# Patient Record
Sex: FEMALE | Race: WHITE | NOT HISPANIC OR LATINO | Employment: PART TIME | URBAN - METROPOLITAN AREA
[De-identification: names, ages, dates, MRNs, and addresses within clinical notes are randomized per-mention and may not be internally consistent; named-entity substitution may affect disease eponyms.]

---

## 2023-01-01 ENCOUNTER — OFFICE VISIT (OUTPATIENT)
Dept: URGENT CARE | Facility: CLINIC | Age: 23
End: 2023-01-01

## 2023-01-01 VITALS
SYSTOLIC BLOOD PRESSURE: 108 MMHG | BODY MASS INDEX: 24.11 KG/M2 | HEIGHT: 66 IN | RESPIRATION RATE: 16 BRPM | WEIGHT: 150 LBS | HEART RATE: 100 BPM | DIASTOLIC BLOOD PRESSURE: 74 MMHG | TEMPERATURE: 98.9 F | OXYGEN SATURATION: 98 %

## 2023-01-01 DIAGNOSIS — L03.90 CELLULITIS, UNSPECIFIED CELLULITIS SITE: ICD-10-CM

## 2023-01-01 DIAGNOSIS — L73.9 FOLLICULITIS: ICD-10-CM

## 2023-01-01 PROCEDURE — 99203 OFFICE O/P NEW LOW 30 MIN: CPT | Performed by: FAMILY MEDICINE

## 2023-01-01 RX ORDER — CLINDAMYCIN HYDROCHLORIDE 300 MG/1
300 CAPSULE ORAL 3 TIMES DAILY
Qty: 15 CAPSULE | Refills: 0 | Status: SHIPPED | OUTPATIENT
Start: 2023-01-01 | End: 2023-01-06

## 2023-01-01 ASSESSMENT — ENCOUNTER SYMPTOMS
COUGH: 0
NAUSEA: 0
SHORTNESS OF BREATH: 0
VOMITING: 0
FEVER: 0
SORE THROAT: 0
CHILLS: 0
MYALGIAS: 0

## 2023-01-01 NOTE — PATIENT INSTRUCTIONS
Cellulitis, Adult    Cellulitis is a skin infection. The infected area is often warm, red, swollen, and sore. It occurs most often in the arms and lower legs. It is very important to get treated for this condition.  What are the causes?  This condition is caused by bacteria. The bacteria enter through a break in the skin, such as a cut, burn, insect bite, open sore, or crack.  What increases the risk?  This condition is more likely to occur in people who:  Have a weak body defense system (immune system).  Have open cuts, burns, bites, or scrapes on the skin.  Are older than 60 years of age.  Have a blood sugar problem (diabetes).  Have a long-lasting (chronic) liver disease (cirrhosis) or kidney disease.  Are very overweight (obese).  Have a skin problem, such as:  Itchy rash (eczema).  Slow movement of blood in the veins (venous stasis).  Fluid buildup below the skin (edema).  Have been treated with high-energy rays (radiation).  Use IV drugs.  What are the signs or symptoms?  Symptoms of this condition include:  Skin that is:  Red.  Streaking.  Spotting.  Swollen.  Sore or painful when you touch it.  Warm.  A fever.  Chills.  Blisters.  How is this diagnosed?  This condition is diagnosed based on:  Medical history.  Physical exam.  Blood tests.  Imaging tests.  How is this treated?  Treatment for this condition may include:  Medicines to treat infections or allergies.  Home care, such as:  Rest.  Placing cold or warm cloths (compresses) on the skin.  Hospital care, if the condition is very bad.  Follow these instructions at home:  Medicines  Take over-the-counter and prescription medicines only as told by your doctor.  If you were prescribed an antibiotic medicine, take it as told by your doctor. Do not stop taking it even if you start to feel better.  General instructions    Drink enough fluid to keep your pee (urine) pale yellow.  Do not touch or rub the infected area.  Raise (elevate) the infected area above  the level of your heart while you are sitting or lying down.  Place cold or warm cloths on the area as told by your doctor.  Keep all follow-up visits as told by your doctor. This is important.  Contact a doctor if:  You have a fever.  You do not start to get better after 1-2 days of treatment.  Your bone or joint under the infected area starts to hurt after the skin has healed.  Your infection comes back. This can happen in the same area or another area.  You have a swollen bump in the area.  You have new symptoms.  You feel ill and have muscle aches and pains.  Get help right away if:  Your symptoms get worse.  You feel very sleepy.  You throw up (vomit) or have watery poop (diarrhea) for a long time.  You see red streaks coming from the area.  Your red area gets larger.  Your red area turns dark in color.  These symptoms may represent a serious problem that is an emergency. Do not wait to see if the symptoms will go away. Get medical help right away. Call your local emergency services (911 in the [place] Do not drive yourself to the hospital.  Summary  Cellulitis is a skin infection. The area is often warm, red, swollen, and sore.  This condition is treated with medicines, rest, and cold and warm cloths.  Take all medicines only as told by your doctor.  Tell your doctor if symptoms do not start to get better after 1-2 days of treatment.  This information is not intended to replace advice given to you by your health care provider. Make sure you discuss any questions you have with your health care provider.  Document Released: 06/05/2009 Document Revised: 05/09/2019 Document Reviewed: 05/09/2019  [employer] Patient Education © 2020 [employer]

## 2023-01-01 NOTE — PROGRESS NOTES
"Subjective:   Scarlet Rivera is a 22 y.o. female who presents for Rash (Sx two days ago, sensitive to touch , bumps )        Rash  Chronicity: Reports painful rash left posterior medial thigh over the past 2 days. The current episode started yesterday. The problem has been gradually worsening since onset. Location: Left posterior thigh. The rash is characterized by pain and redness. She was exposed to nothing. Pertinent negatives include no cough, fever, shortness of breath, sore throat or vomiting. Treatments tried: Routine skin hygiene. The treatment provided no relief.   PMH:  has no past medical history on file.  MEDS:   Current Outpatient Medications:     clindamycin (CLEOCIN) 300 MG Cap, Take 1 Capsule by mouth 3 times a day for 5 days., Disp: 15 Capsule, Rfl: 0    mupirocin (BACTROBAN) 2 % Ointment, Apply 1 Application topically 2 times a day., Disp: 22 g, Rfl: 0  ALLERGIES: Not on File  SURGHX: History reviewed. No pertinent surgical history.  SOCHX:    FH: History reviewed. No pertinent family history.  Review of Systems   Constitutional:  Negative for chills and fever.   HENT:  Negative for sore throat.    Respiratory:  Negative for cough and shortness of breath.    Gastrointestinal:  Negative for nausea and vomiting.   Musculoskeletal:  Negative for myalgias.   Skin:  Positive for rash.      Objective:   Pulse 100   Temp 37.2 °C (98.9 °F) (Temporal)   Resp 16   Ht 1.676 m (5' 6\")   Wt 68 kg (150 lb)   SpO2 98%   BMI 24.21 kg/m²   Physical Exam  Vitals and nursing note reviewed.   Constitutional:       General: She is not in acute distress.     Appearance: She is well-developed.   HENT:      Head: Normocephalic and atraumatic.      Right Ear: External ear normal.      Left Ear: External ear normal.      Nose: Nose normal.      Mouth/Throat:      Mouth: Mucous membranes are moist.   Eyes:      Conjunctiva/sclera: Conjunctivae normal.   Cardiovascular:      Rate and Rhythm: Normal rate.   Pulmonary:      " Effort: Pulmonary effort is normal. No respiratory distress.      Breath sounds: Normal breath sounds.   Abdominal:      General: There is no distension.   Musculoskeletal:         General: Normal range of motion.        Legs:    Skin:     General: Skin is warm and dry.   Neurological:      General: No focal deficit present.      Mental Status: She is alert and oriented to person, place, and time. Mental status is at baseline.      Gait: Gait (gait at baseline) normal.   Psychiatric:         Judgment: Judgment normal.         Assessment/Plan:   1. Cellulitis, unspecified cellulitis site  - clindamycin (CLEOCIN) 300 MG Cap; Take 1 Capsule by mouth 3 times a day for 5 days.  Dispense: 15 Capsule; Refill: 0    2. Folliculitis  - mupirocin (BACTROBAN) 2 % Ointment; Apply 1 Application topically 2 times a day.  Dispense: 22 g; Refill: 0        Medical Decision Making/Course:  In the course of preparing for this visit with review of the pertinent past medical history, recent and past clinic visits, current medications, and performing chart, immunization, medical history and medication reconciliation, and in the further course of obtaining the current history pertinent to the clinic visit today, performing an exam and evaluation, ordering and independently evaluating labs, tests  , and/or procedures, prescribing any recommended new medications as noted above, providing any pertinent counseling and education and recommending further coordination of care including recommendation for symptomatic and supportive measures, at least  14 minutes of total time were spent during this encounter.      Discussed close monitoring, return precautions, and supportive measures of maintaining adequate fluid hydration and caloric intake, relative rest and symptom management as needed for pain and/or fever.    Differential diagnosis, natural history, supportive care, and indications for immediate follow-up discussed.     Advised the patient  to follow-up with the primary care physician for recheck, reevaluation, and consideration of further management.    Please note that this dictation was created using voice recognition software. I have worked with consultants from the vendor as well as technical experts from [employer] to optimize the interface. I have made every reasonable attempt to correct obvious errors, but I expect that there are errors of grammar and possibly content that I did not discover before finalizing the note.

## 2023-11-09 ENCOUNTER — OFFICE VISIT (OUTPATIENT)
Dept: URGENT CARE | Facility: CLINIC | Age: 23
End: 2023-11-09
Payer: COMMERCIAL

## 2023-11-09 VITALS
BODY MASS INDEX: 25.9 KG/M2 | SYSTOLIC BLOOD PRESSURE: 102 MMHG | RESPIRATION RATE: 18 BRPM | TEMPERATURE: 99 F | HEIGHT: 67 IN | WEIGHT: 165 LBS | OXYGEN SATURATION: 98 % | DIASTOLIC BLOOD PRESSURE: 84 MMHG | HEART RATE: 94 BPM

## 2023-11-09 DIAGNOSIS — S61.213A LACERATION OF LEFT MIDDLE FINGER WITHOUT FOREIGN BODY WITHOUT DAMAGE TO NAIL, INITIAL ENCOUNTER: ICD-10-CM

## 2023-11-09 PROCEDURE — 3074F SYST BP LT 130 MM HG: CPT | Performed by: REGISTERED NURSE

## 2023-11-09 PROCEDURE — 3079F DIAST BP 80-89 MM HG: CPT | Performed by: REGISTERED NURSE

## 2023-11-09 PROCEDURE — 12002 RPR S/N/AX/GEN/TRNK2.6-7.5CM: CPT | Performed by: REGISTERED NURSE

## 2023-11-09 NOTE — LETTER
November 9, 2023         Patient: Scarlet Rivera   YOB: 2000   Date of Visit: 11/9/2023           To Whom it May Concern:    Scarlet Rivera was seen in my clinic on 11/9/2023. She may return to work on 11/13/23.    If you have any questions or concerns, please don't hesitate to call.        Sincerely,           AYESHA Horowitz  Electronically Signed

## 2023-11-09 NOTE — PROGRESS NOTES
"Subjective:     Scarlet Rivera is a 22 y.o. female who presents for Laceration (About 3 hours ago, 3rd finger on left hand )      Laceration     Today taking  out of dishwaher and got a cut to the top of left middle finger. Bleeding controlled. No numbness or tingling. Normal ROM. Not on blood thinners. Tetanus up to date. Patients states she does not do well with needles and passes out. Left hand dominant. Works in  which is closed tomorrow.    ROS : Negative unless mentioned in hpi    Allergies: No Known Allergies    Current Medications:  mupirocin Oint    (Allergies, Medications, & Tobacco/Substance Use were reconciled by the Medical Assistant and reviewed by myself. )       Objective:     /84   Pulse 94   Temp 37.2 °C (99 °F) (Temporal)   Resp 18   Ht 1.702 m (5' 7\")   Wt 74.8 kg (165 lb)   SpO2 98%   BMI 25.84 kg/m²     Physical Exam  Vitals and nursing note reviewed.   Constitutional:       Appearance: She is not ill-appearing or toxic-appearing.   HENT:      Head: Normocephalic.   Eyes:      Pupils: Pupils are equal, round, and reactive to light.   Cardiovascular:      Rate and Rhythm: Normal rate.   Pulmonary:      Effort: Pulmonary effort is normal.   Musculoskeletal:      Comments: Normal ROM left middle finger. Neurovascular intact distally   Skin:     Comments: 2.5cm full thickness laceration dorsal left middle finger, no foreign bodies, no pulsatile bleeding   Neurological:      General: No focal deficit present.      Mental Status: She is alert.      Sensory: No sensory deficit.   Psychiatric:         Mood and Affect: Mood normal.         Assessment/Plan:     1. Laceration of left middle finger without foreign body without damage to nail, initial encounter          Tdap up to date.    Laceration Repair     -Risks including bleeding, nerve damage, infection, and poor cosmetic outcome discussed at length. Benefits and alternatives discussed. Verbal consent was " obtained.    Performed by: Aaron IBARRA  Body area: left hand  Location details: middle finger, dorsal  Laceration length: 2.5 cm  Foreign bodies: no foreign bodies     Anesthesia:  Local Anesthetic: 1% lido     Sedation:  Patient sedated: no     Irrigation solution: saline  Irrigation method: syringe  Amount of cleaning: standard  Debridement: none  Degree of undermining: none  Skin closure: #4 5-0 Nylon simple interrupted sutures with good wound approximation    -Patient tolerated well with no complications or blood loss  -Wound care discussed  -Signs and symptoms of new/worsening infection discussed at length  -Return in 7-10 days for removal      Differential diagnosis, natural history, supportive care, and indications for immediate follow-up discussed.    Advised the patient to follow-up with the primary care physician for recheck, reevaluation, and consideration of further management.    Please note that this dictation was created using voice recognition software. I have made reasonable attempt to correct obvious errors, but I expect that there are errors of grammar and possibly content that I did not discover before finalizing the note.    This note was electronically signed by AYESHA Horowitz

## 2024-06-30 ENCOUNTER — OFFICE VISIT (OUTPATIENT)
Dept: URGENT CARE | Facility: CLINIC | Age: 24
End: 2024-06-30
Payer: COMMERCIAL

## 2024-06-30 VITALS
RESPIRATION RATE: 16 BRPM | SYSTOLIC BLOOD PRESSURE: 112 MMHG | HEART RATE: 106 BPM | WEIGHT: 154.9 LBS | DIASTOLIC BLOOD PRESSURE: 64 MMHG | HEIGHT: 67 IN | TEMPERATURE: 97.5 F | BODY MASS INDEX: 24.31 KG/M2 | OXYGEN SATURATION: 98 %

## 2024-06-30 DIAGNOSIS — R59.0 LAD (LYMPHADENOPATHY) OF LEFT CERVICAL REGION: ICD-10-CM

## 2024-06-30 LAB
HETEROPH AB SER QL LA: NEGATIVE
POCT INT CON NEG: NEGATIVE
POCT INT CON POS: NEGATIVE
S PYO DNA SPEC NAA+PROBE: NOT DETECTED

## 2024-06-30 ASSESSMENT — ENCOUNTER SYMPTOMS
FEVER: 0
NAUSEA: 0
CHILLS: 0
NECK PAIN: 1
VOMITING: 0

## 2024-06-30 NOTE — PROGRESS NOTES
"Subjective:   Scarlet Rivera is a 23 y.o. female who presents for Neck Pain (X 2 weeks went to the ER and gave her IV fluids)        Patient presents with concerns of a painful swollen lump on the left side of her neck for the last 2+ weeks.  States that she was seen in the emergency room for this 5 days ago.  States that no testing was performed.  Patient is very concerned that she may have cancer.  No night sweats, weight loss, sore throat, pain with swallowing, fevers, chills, malaise, fatigue.  Her ear does feel painful at times.  Patient had a root canal about 2 weeks ago and completed a course of clindamycin.  She is not currently on antibiotic therapy.  She is not taking any medications for her symptoms.    Review of Systems   Constitutional:  Negative for chills and fever.   Gastrointestinal:  Negative for nausea and vomiting.   Musculoskeletal:  Positive for neck pain.       PMH:  has no past medical history on file.  MEDS:   Current Outpatient Medications:     mupirocin (BACTROBAN) 2 % Ointment, Apply 1 Application topically 2 times a day. (Patient not taking: Reported on 11/9/2023), Disp: 22 g, Rfl: 0  ALLERGIES: No Known Allergies  SURGHX: No past surgical history on file.  SOCHX:  reports that she has never smoked. She has never used smokeless tobacco. She reports current alcohol use. She reports that she does not currently use drugs.  FH: Family history was reviewed, no pertinent findings to report   Objective:   /64   Pulse (!) 106   Temp 36.4 °C (97.5 °F) (Temporal)   Resp 16   Ht 1.702 m (5' 7\")   Wt 70.3 kg (154 lb 14.4 oz)   SpO2 98%   BMI 24.26 kg/m²   Physical Exam  Vitals reviewed.   Constitutional:       Appearance: Normal appearance. She is well-developed. She is not toxic-appearing.      Comments: Patient is tearful and anxious.   HENT:      Head: Normocephalic and atraumatic.      Right Ear: Ear canal and external ear normal. Tympanic membrane is scarred. Tympanic membrane " is not erythematous or bulging.      Left Ear: Ear canal and external ear normal.  No middle ear effusion. Tympanic membrane is scarred. Tympanic membrane is not erythematous or bulging.      Nose: Nose normal.   Cardiovascular:      Rate and Rhythm: Normal rate and regular rhythm.      Heart sounds: Normal heart sounds, S1 normal and S2 normal.   Pulmonary:      Effort: Pulmonary effort is normal. No respiratory distress.      Breath sounds: Normal breath sounds. No stridor. No decreased breath sounds, wheezing, rhonchi or rales.   Lymphadenopathy:      Cervical:      Right cervical: No superficial cervical adenopathy.     Left cervical: No superficial cervical adenopathy.      Comments: Mildly enlarged left posterior cervical lymph node as compared to the right side.  No overlying erythema or edema.  Mildly tender to palpation.   Skin:     General: Skin is dry.   Neurological:      Comments: Alert and oriented.    Psychiatric:         Speech: Speech normal.         Behavior: Behavior normal.         Results for orders placed or performed in visit on 06/30/24   POCT GROUP A STREP, PCR   Result Value Ref Range    POC Group A Strep, PCR Not Detected Not Detected, Invalid   POCT Mononucleosis (mono)   Result Value Ref Range    Heterophile Screen Negative Negative, Invalid    Internal Control Positive Negative     Internal Control Negative Negative        Assessment/Plan:   1. LAD (lymphadenopathy) of left cervical region  - US-SOFT TISSUES OF HEAD - NECK; Future  - POCT GROUP A STREP, PCR  - POCT Mononucleosis (mono)    Testing for group A strep and mono are negative.  Will obtain an ultrasound to further evaluate.  I will contact patient with results and adjust treatment plan as indicated.

## 2024-07-01 NOTE — RESULT ENCOUNTER NOTE
Anil Novak,    Your testing for strep throat and mono were negative.  I will be in touch on Tuesday to go over your ultrasound results.      Kind regards,  Oneal

## 2024-07-02 ENCOUNTER — HOSPITAL ENCOUNTER (OUTPATIENT)
Dept: RADIOLOGY | Facility: MEDICAL CENTER | Age: 24
End: 2024-07-02
Attending: PHYSICIAN ASSISTANT
Payer: COMMERCIAL

## 2024-07-02 DIAGNOSIS — R59.0 LAD (LYMPHADENOPATHY) OF LEFT CERVICAL REGION: ICD-10-CM

## 2024-07-02 PROCEDURE — 76536 US EXAM OF HEAD AND NECK: CPT

## 2024-07-06 ENCOUNTER — OFFICE VISIT (OUTPATIENT)
Dept: URGENT CARE | Facility: CLINIC | Age: 24
End: 2024-07-06
Payer: COMMERCIAL

## 2024-07-06 VITALS
TEMPERATURE: 98.5 F | OXYGEN SATURATION: 97 % | WEIGHT: 152.3 LBS | DIASTOLIC BLOOD PRESSURE: 78 MMHG | RESPIRATION RATE: 13 BRPM | BODY MASS INDEX: 23.9 KG/M2 | SYSTOLIC BLOOD PRESSURE: 112 MMHG | HEIGHT: 67 IN | HEART RATE: 96 BPM

## 2024-07-06 DIAGNOSIS — B37.0 ORAL THRUSH: ICD-10-CM

## 2024-07-06 PROCEDURE — 99213 OFFICE O/P EST LOW 20 MIN: CPT | Performed by: NURSE PRACTITIONER

## 2024-07-06 PROCEDURE — 3074F SYST BP LT 130 MM HG: CPT | Performed by: NURSE PRACTITIONER

## 2024-07-06 PROCEDURE — 3078F DIAST BP <80 MM HG: CPT | Performed by: NURSE PRACTITIONER

## 2024-07-06 RX ORDER — CLOTRIMAZOLE 10 MG/1
10 LOZENGE ORAL
Qty: 35 TROCHE | Refills: 1 | Status: SHIPPED | OUTPATIENT
Start: 2024-07-06

## 2024-07-22 ENCOUNTER — APPOINTMENT (OUTPATIENT)
Dept: URGENT CARE | Facility: CLINIC | Age: 24
End: 2024-07-22
Payer: COMMERCIAL

## 2025-04-28 ENCOUNTER — OFFICE VISIT (OUTPATIENT)
Dept: URGENT CARE | Facility: CLINIC | Age: 25
End: 2025-04-28
Payer: COMMERCIAL

## 2025-04-28 VITALS
RESPIRATION RATE: 12 BRPM | HEIGHT: 67 IN | SYSTOLIC BLOOD PRESSURE: 116 MMHG | HEART RATE: 100 BPM | DIASTOLIC BLOOD PRESSURE: 78 MMHG | TEMPERATURE: 97.2 F | WEIGHT: 164 LBS | BODY MASS INDEX: 25.74 KG/M2 | OXYGEN SATURATION: 99 %

## 2025-04-28 DIAGNOSIS — M54.50 ACUTE LEFT-SIDED LOW BACK PAIN WITHOUT SCIATICA: ICD-10-CM

## 2025-04-28 LAB
APPEARANCE UR: CLEAR
BILIRUB UR STRIP-MCNC: NEGATIVE MG/DL
COLOR UR AUTO: YELLOW
GLUCOSE UR STRIP.AUTO-MCNC: NEGATIVE MG/DL
KETONES UR STRIP.AUTO-MCNC: NEGATIVE MG/DL
LEUKOCYTE ESTERASE UR QL STRIP.AUTO: NEGATIVE
NITRITE UR QL STRIP.AUTO: NEGATIVE
PH UR STRIP.AUTO: 5.5 [PH] (ref 5–8)
POCT INT CON NEG: NEGATIVE
POCT INT CON POS: POSITIVE
POCT URINE PREGNANCY TEST: NEGATIVE
PROT UR QL STRIP: NEGATIVE MG/DL
RBC UR QL AUTO: NEGATIVE
SP GR UR STRIP.AUTO: <=1.005
UROBILINOGEN UR STRIP-MCNC: 0.2 MG/DL

## 2025-04-28 RX ORDER — CYCLOBENZAPRINE HCL 5 MG
5 TABLET ORAL 3 TIMES DAILY PRN
Qty: 30 TABLET | Refills: 0 | Status: SHIPPED | OUTPATIENT
Start: 2025-04-28

## 2025-04-28 RX ORDER — MELOXICAM 15 MG/1
15 TABLET ORAL DAILY
Qty: 30 TABLET | Refills: 0 | Status: SHIPPED | OUTPATIENT
Start: 2025-04-28

## 2025-04-28 ASSESSMENT — ENCOUNTER SYMPTOMS
CHILLS: 0
TINGLING: 1
EYE REDNESS: 0
SORE THROAT: 0
NAUSEA: 0
FEVER: 0
VOMITING: 0
NUMBNESS: 0
BACK PAIN: 1
LEG PAIN: 0
MYALGIAS: 0
DIZZINESS: 0
SHORTNESS OF BREATH: 0

## 2025-04-29 NOTE — PROGRESS NOTES
Subjective:   Scarlet Rivera is a 24 y.o. female who presents for Back Pain (Patient is here today for back pain. Patient states it is in the lower area of her back. Patient states that the pain is mostly on her left side. )      Back Pain  This is a new problem. The current episode started more than 1 month ago. The problem occurs constantly. The problem is unchanged. The pain is present in the lumbar spine. The quality of the pain is described as aching and shooting. The pain radiates to the left foot. The pain is moderate. The pain is The same all the time. The symptoms are aggravated by position and standing. Stiffness is present All day. Associated symptoms include tingling. Pertinent negatives include no chest pain, dysuria, fever, leg pain, numbness or pelvic pain. She has tried analgesics for the symptoms.       Review of Systems   Constitutional:  Negative for chills and fever.   HENT:  Negative for sore throat.    Eyes:  Negative for redness.   Respiratory:  Negative for shortness of breath.    Cardiovascular:  Negative for chest pain.   Gastrointestinal:  Negative for nausea and vomiting.   Genitourinary:  Negative for dysuria and pelvic pain.   Musculoskeletal:  Positive for back pain. Negative for myalgias.   Skin:  Negative for rash.   Neurological:  Positive for tingling. Negative for dizziness and numbness.       Medications:    clotrimazole Troc  cyclobenzaprine  meloxicam  mupirocin Oint    Allergies: Patient has no known allergies.    Problem List: Scarlet Rivera does not have a problem list on file.    Surgical History:  No past surgical history on file.    Past Social Hx: Scarlet Rivera  reports that she has never smoked. She has never used smokeless tobacco. She reports current alcohol use. She reports that she does not currently use drugs.     Past Family Hx:  Scarlet Rivera family history is not on file.     Problem list, medications, and allergies reviewed by myself today in  "Epic.     Objective:     /78   Pulse 100   Temp 36.2 °C (97.2 °F) (Temporal)   Resp 12   Ht 1.689 m (5' 6.5\")   Wt 74.4 kg (164 lb)   SpO2 99%   BMI 26.07 kg/m²     Physical Exam  Constitutional:       Appearance: Normal appearance. She is not ill-appearing or toxic-appearing.   HENT:      Head: Normocephalic.      Right Ear: External ear normal.      Left Ear: External ear normal.      Nose: Nose normal.      Mouth/Throat:      Lips: Pink.   Eyes:      General: Lids are normal.   Pulmonary:      Effort: Pulmonary effort is normal. No accessory muscle usage.   Abdominal:      Tenderness: There is no right CVA tenderness or left CVA tenderness.   Musculoskeletal:      Cervical back: Normal and full passive range of motion without pain.      Thoracic back: Normal.      Lumbar back: Spasms and tenderness present. No swelling, deformity or bony tenderness. Decreased range of motion.   Neurological:      Mental Status: She is alert and oriented to person, place, and time.   Psychiatric:         Mood and Affect: Mood normal.         Thought Content: Thought content normal.         Assessment/Plan:     Diagnosis and associated orders:     1. Acute left-sided low back pain without sciatica  POCT Pregnancy    POCT Urinalysis    cyclobenzaprine (FLEXERIL) 5 MG tablet    meloxicam (MOBIC) 15 MG tablet    Referral to Physical Therapy    Referral to Pain Clinic         Comments/MDM:     I personally reviewed prior external notes and prior test results pertinent to today's visit.  UA negative, no signs of cauda equina ongoing symptoms x 1 month unimproved.  Encouraged range of motion, heat massage, stretching.  Discussed management options, risks and benefits, and alternatives to treatment plan agreed upon.  Will transfer care to physiatry for further evaluation  Red flags discussed and indications to immediately call 911 or present to the Emergency Department.   Supportive care, differential diagnoses, and " indications for immediate follow-up discussed with patient.    Patient expresses understanding and agrees to plan. Patient denies any other questions or concerns.                  Please note that this dictation was created using voice recognition software. I have made a reasonable attempt to correct obvious errors, but I expect that there are errors of grammar and possibly content that I did not discover before finalizing the note.    This note was electronically signed by Matthew IBARRA.

## 2025-04-29 NOTE — Clinical Note
Member Name: Scarlet Rivera   Member Number: [id number]   Reference Number: [id number]   Approved Services: Consultation   Approved Service Dates: 04/28/2025 - 04/28/2026   Requesting Provider: Matthew Anthony   Requested Provider: [facility]     Dear Scarlet Rivera:     The following medical service(s) requested by Matthew Anthony have been approved:    Procedure Code Procedure Code Name Requested Quantity Approved Quantity Status   95201 (CPT®) [place] OFFICE/OUTPATIENT NEW MODERATE MDM 45 MINUTES 1 1 Authorized   21514 (CPT®) [place] OFFICE/OUTPATIENT ESTABLISHED MOD MDM 30 MIN 5 5 Authorized       Approved Quantity means the number of visits approved for medication treatments and/or medical services.    The services should be provided by [facility] no later than 04/28/2026. Please contact the provider listed below with any questions.     Provider Information:  [facility]  [phone]    Your plan benefit may require a deductible, co-payment or coinsurance for these services. This authorization does not guarantee [facility] will pay the claim for services that you receive. Payment by [facility] for these services is subject to the terms of your Evidence of Coverage or Summary Plan Description, your eligibility at the time of service, and confirmation of benefit coverage.    For any questions or additional information, please contact Customer Service:    [facility]  Customer Service: [phone] or toll free [phone]  TTY users dial: 711   Call Center Hours: Mon - Fri 7 AM to 8 PM PST   Office Hours: Mon - Fri 8 AM to 5 PM [facility]   E-mail: [email or web address]   Website: [email or web address]       This information is available for free in other languages. Please contact Customer Service at the phone number above for more information. [facility] complies with applicable Federal civil rights laws and does not  discriminate on the basis of race, color, national origin, age, disability or sex.      Sincerely,     Healthcare Utilization Management Department     Cc: [facility]   Matthew Anthony

## 2025-05-02 NOTE — Clinical Note
REFERRAL APPROVAL NOTICE         Sent on May 2, 2025                   Scarlet Rivera  [address]  [address]  [place] [address]                   Dear Ms. Rivera,    After a careful review of the medical information and benefit coverage, [facility] has processed your referral. See below for additional details.    If applicable, you must be actively enrolled with your insurance for coverage of the authorized service. If you have any questions regarding your coverage, please contact your insurance directly.    REFERRAL INFORMATION   Referral #:  [id number]  Referred-To Department    Referred-By Provider:  Physical Therapy    AYESHA Lock   [facility] [address]      [address]  [address]  [place] [id number] [phone] [address]  [address]  [place] [id number] [phone]    Referral Start Date:  04/28/2025  Referral End Date:   12/31/2025             SCHEDULING  If you do not already have an appointment, please call [phone] to make an appointment.     MORE INFORMATION  If you do not already have a [employer] account, sign up at: [email or web address]  You can access your medical information, make appointments, see lab results, billing information, and more.  If you have questions regarding this referral, please contact  the [facility] Referrals department at:             [phone]. Monday - Friday 8:00AM - 5:00PM.     Sincerely,    [facility]

## 2025-05-07 NOTE — PROGRESS NOTES
Verbal consent was acquired by the patient to use [employer] ambient listening note generation during this visit Yes     NEW PATIENT VISIT     Interventional Spine and Pain  Physiatry (Physical Medicine and Rehabilitation)     Date of Service: See Epic  Chief Complaint:   Chief Complaint   Patient presents with    New Patient      Acute left-sided low back pain without sciatica      Referring Provider: Matthew Anthony A.P.*   Patient Name: Scarlet Rivera   : 2000   MRN: [id number]     History:     HPI:     Scarlet Rivera is a 24 y.o. female who presents to clinic for evaluation of back and leg pain.     History of Present Illness  The patient is a 24-year-old female who presents today for low back pain and bilateral leg numbness. She has been experiencing low back pain for approximately a month, which was initially rated as 7 to 8 out of 10 in intensity and has since decreased to 3 to 4 out of 10. The pain is described as an aching and sharp sensation in the bilateral low back and superior buttocks, accompanied by numbness that alternates between the right and left posterior thigh and calf. The pain intensifies at the end of the day and is exacerbated by bending forward, backward, twisting, and climbing stairs. It is somewhat alleviated by standing, walking, or lying down. She sought medical attention at an urgent care facility where she was prescribed medication, which she believes has been effective. The healthcare providers at the urgent care suspected sciatica. She reports no specific incident that could have triggered the pain. 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 The pain initially presented in her posterior low back and hips, more so on the left side, and was constant, severely limiting her ability to bend over or  objects. Despite some improvement, she continues to experience pain even while  sitting. She reports no history of similar issues. The numbness initially presented in her left leg and subsequently extended into her calf, but not into her foot. This symptom is intermittent. She reports no leg weakness or changes in bowel or bladder function. She has not undergone any physical therapy or exercises. She has not taken any recent antibiotics. She is currently taking meloxicam daily and Flexeril three times daily, which does not induce drowsiness.       Red Flags ROS:   Fever, Chills, Sweats: Denies  Involuntary Weight Loss: Denies  Bladder Incontinence: Denies  Bowel Incontinence: Denies  Saddle Anesthesia: Denies       Medical Records Review:  I reviewed the note from the referring provider Matthew Anthony A.P.* including the note dated 4/28/25 where they discussed back and leg pain.       PMHx:   History reviewed. No pertinent past medical history.    No current outpatient medications on file prior to visit.     No current facility-administered medications on file prior to visit.        PSHx:   History reviewed. No pertinent surgical history.    Family history   History reviewed. No pertinent family history.      Medications: Reviewed on [facility]  Outpatient Medications Marked as Taking for the 5/8/25 encounter (Office Visit) with Freya Patel M.D.   Medication Sig Dispense Refill    meloxicam (MOBIC) 15 MG tablet Take 1 Tablet by mouth every day. 30 Tablet 1    cyclobenzaprine (FLEXERIL) 5 MG tablet Take 1 Tablet by mouth 3 times a day as needed for Moderate Pain or Muscle Spasms. 90 Tablet 1        Allergies:   No Known Allergies    Social Hx:   Social History     Socioeconomic History    Marital status: Single     Spouse name: Not on file    Number of children: Not on file    Years of education: Not on file    Highest education level: Not on file   Occupational History    Not on file   Tobacco Use    Smoking status: Never    Smokeless tobacco: Never   Vaping Use    Vaping status: Never Used  "  Substance and Sexual Activity    Alcohol use: Yes     Comment: occ    Drug use: Not Currently    Sexual activity: Not on file   Other Topics Concern    Not on file   Social History Narrative    Not on file     Social Drivers of Health     Financial Resource Strain: Not on file   Food Insecurity: Not on file   Transportation Needs: Not on file   Physical Activity: Not on file   Stress: Not on file   Social Connections: Not on file   Intimate Partner Violence: Not on file   Housing Stability: Not on file          EXAMINATION     Physical Exam:   Vitals: /80 (BP Location: Right arm, Patient Position: Sitting, BP Cuff Size: Adult)   Pulse 99   Temp 36.4 °C (97.6 °F) (Temporal)   Ht 1.702 m (5' 7\")   Wt 74.8 kg (164 lb 14.5 oz)   SpO2 99%       Constitutional:   Body Habitus: Body mass index is 25.83 kg/m².  Cooperation: Fully cooperates with exam  Appearance: Well-groomed, well-nourished  Eyes: No scleral icterus to suggest severe liver disease, no proptosis to suggest severe hyperthyroid  ENT: No obvious auditory deficits, no obvious tongue lesions, tongue midline, no facial droop   Respiratory:  Breathing comfortable on room air, no audible wheezing  Cardiovascular: Skin appears well-perfused  Psychiatric: Appropriate affect  Gait: normal gait, no use of ambulatory device, nonantalgic. the patient can toe walk with ease. the patient can heel walk with ease. the patient can tandem walk with ease.    Neurologic:  Strength:    Bilateral LE 5/5 in hip flexors, knee extensors and flexors, ankle dorsiflexors and plantarflexors  Reflexes: 2+ in bilateral patella and achilles  Sensation: grossly intact bilaterally dermatomes L3-S1  MSK:?TTP across lower lumbar axial spinous processes and paraspinals bilaterally, has?preserved?active lumbar ROM with pain with extension     Special?tests:    Positive slump test bilaterally       MEDICAL DECISION MAKING    Medical Records Review: See under HPI " section    DATA      Imaging:   No spine imaging         Diagnosis   Visit Diagnoses     ICD-10-CM   1. Acute bilateral low back pain with bilateral sciatica  M54.42    M54.41   2. Discogenic low back pain  M51.360         ASSESSMENT AND PLAN:  Scarlet Rivera is a 24 y.o. female who presents to clinic for evaluation of low back pain and bilateral leg numbness.     Scarlet was seen today for new patient.    Diagnoses and all orders for this visit:    Acute bilateral low back pain with bilateral sciatica  -     Referral to Physical Therapy  -     meloxicam (MOBIC) 15 MG tablet; Take 1 Tablet by mouth every day.  -     cyclobenzaprine (FLEXERIL) 5 MG tablet; Take 1 Tablet by mouth 3 times a day as needed for Moderate Pain or Muscle Spasms.    Discogenic low back pain  -     Referral to Physical Therapy  -     meloxicam (MOBIC) 15 MG tablet; Take 1 Tablet by mouth every day.  -     cyclobenzaprine (FLEXERIL) 5 MG tablet; Take 1 Tablet by mouth 3 times a day as needed for Moderate Pain or Muscle Spasms.        Assessment & Plan  Bilateral low back pain with bilateral lower extremity numbness  Discogenic low back pain  Patient presents for evaluation of approximately 1 month of moderate to severe bilateral low back pain with intermittent numbness and tingling in the bilateral posterior thighs and calves. The patient's symptoms are indicative of a potential disc herniation, either discogenic low back pain or lumbar radiculopathy. We discussed the favorable natural history of disc herniation. Neurological examination reveals no abnormalities, with strength, sensation, and reflexes all within normal limits, and so we will start with conservative care. She will continue her current medication regimen, including meloxicam once daily and Flexeril 5 mg three times daily, for an additional 2 months. Refills for these medications have been provided to [facility] on [facility]. A referral to [facility] has been made for  strengthening or the core and lumbar spine. She has been advised to perform home exercises, holding each for a count of three and completing 10 repetitions on each side. If any exercise exacerbates her pain, she should discontinue it until her physical therapy appointment. Concurrent use of ibuprofen with meloxicam is contraindicated, but Tylenol can be safely used in conjunction with meloxicam. Should there be no improvement in her condition, an MRI will be considered to evaluate the possibility of a significant disc herniation and for consideration of epidural steroid injection.      Physical Therapy: I ordered physical therapy to focus on strengthening and stretching for the lumbar spine    Home Exercise Program: I provided the patient with a strengthening and stretching with a home exercise program targeting the low back    Diagnostic Workup: Consider MRI lumbar spine if no improvement after 6 weeks    Medications: Meloxicam 15 mg daily, not to be used with other NSAIDs. Cyclobenzaprine 5 mg up to three times a day as needed.    Follow-up: In 6 weeks      Please note that this dictation was created using voice recognition software. I have made every reasonable attempt to correct obvious errors but there may be errors of grammar and content that I may have overlooked prior to finalization of this note.      Freya Patel MD  Physical Medicine and Rehabilitation  Interventional Spine and Sports Physiatry  [facility]       CC Pcp Pt States None   CC Matthew Anthony A.P.*.

## 2025-05-08 ENCOUNTER — APPOINTMENT (OUTPATIENT)
Dept: PHYSICAL MEDICINE AND REHAB | Facility: MEDICAL CENTER | Age: 25
End: 2025-05-08
Payer: COMMERCIAL

## 2025-05-08 VITALS
BODY MASS INDEX: 25.88 KG/M2 | WEIGHT: 164.9 LBS | TEMPERATURE: 97.6 F | HEIGHT: 67 IN | SYSTOLIC BLOOD PRESSURE: 124 MMHG | OXYGEN SATURATION: 99 % | HEART RATE: 99 BPM | DIASTOLIC BLOOD PRESSURE: 80 MMHG

## 2025-05-08 DIAGNOSIS — M54.42 ACUTE BILATERAL LOW BACK PAIN WITH BILATERAL SCIATICA: Primary | ICD-10-CM

## 2025-05-08 DIAGNOSIS — M51.360 DISCOGENIC LOW BACK PAIN: ICD-10-CM

## 2025-05-08 DIAGNOSIS — M54.41 ACUTE BILATERAL LOW BACK PAIN WITH BILATERAL SCIATICA: Primary | ICD-10-CM

## 2025-05-08 PROCEDURE — 3074F SYST BP LT 130 MM HG: CPT | Performed by: STUDENT IN AN ORGANIZED HEALTH CARE EDUCATION/TRAINING PROGRAM

## 2025-05-08 PROCEDURE — 1125F AMNT PAIN NOTED PAIN PRSNT: CPT | Performed by: STUDENT IN AN ORGANIZED HEALTH CARE EDUCATION/TRAINING PROGRAM

## 2025-05-08 PROCEDURE — 99204 OFFICE O/P NEW MOD 45 MIN: CPT | Performed by: STUDENT IN AN ORGANIZED HEALTH CARE EDUCATION/TRAINING PROGRAM

## 2025-05-08 PROCEDURE — 3079F DIAST BP 80-89 MM HG: CPT | Performed by: STUDENT IN AN ORGANIZED HEALTH CARE EDUCATION/TRAINING PROGRAM

## 2025-05-08 RX ORDER — MELOXICAM 15 MG/1
15 TABLET ORAL DAILY
Qty: 30 TABLET | Refills: 1 | Status: SHIPPED | OUTPATIENT
Start: 2025-05-08

## 2025-05-08 RX ORDER — CYCLOBENZAPRINE HCL 5 MG
5 TABLET ORAL 3 TIMES DAILY PRN
Qty: 90 TABLET | Refills: 1 | Status: SHIPPED | OUTPATIENT
Start: 2025-05-08

## 2025-05-08 ASSESSMENT — PAIN SCALES - GENERAL: PAINLEVEL_OUTOF10: 2=MINIMAL-SLIGHT

## 2025-05-08 ASSESSMENT — PATIENT HEALTH QUESTIONNAIRE - PHQ9
CLINICAL INTERPRETATION OF PHQ2 SCORE: 1
SUM OF ALL RESPONSES TO PHQ QUESTIONS 1-9: 3
5. POOR APPETITE OR OVEREATING: 0 - NOT AT ALL

## 2025-05-29 NOTE — PROGRESS NOTES
Verbal consent was acquired by the patient to use [employer] ambient listening note generation during this visit Yes     FOLLOW-UP PATIENT VISIT    Interventional Spine and Pain  Physiatry (Physical Medicine and Rehabilitation)     Date of Service: 25   Chief Complaint:   Chief Complaint   Patient presents with    Follow-Up     Acute bilateral low back pain with bilateral sciatica        Patient Name: Scarlet Rivera   : 2000   MRN: [id number]       PRIOR HISTORY    Please see new patient note by Dr. Patel for more details.     Interval History  Patient identification: Scarlet Rivera,  2000, who presents today for follow-up of low back pain and bilateral leg numbness.    History of Present Illness  The patient is a 24-year-old female who presents today for follow-up of left greater than right low back pain with tingling in the left greater than right buttock and lower extremity. Since she was last seen approximately 3 weeks ago, she reports that her pain has unfortunately been worse. She reports a worsening of her back pain, which she describes as fluctuating in intensity. The onset of this exacerbation was noted after an attempt to rise from her couch earlier this week. She has been experiencing worsened numbness and tingling in the legs, more pronounced on the left side. Her sleep quality has deteriorated due to the pain, particularly when transitioning from side to side, which often results in awakenings. She has been attempting to sleep on her side rather than her usual prone position. She reports no new weakness, fevers, chills, or need for antibiotics. She has not previously undergone an MRI and admits to mild claustrophobia. Despite taking meloxicam once daily and Flexeril three times daily, she reports no significant relief. She sought chiropractic care yesterday, where she was informed of a potential disc impingement on a nerve. She is scheduled for bi-weekly chiropractic  "sessions for the next month. She has not yet initiated physical therapy (scheduled to start next week) but has been performing prescribed exercises at home.        PMHx:   Past Medical History[1]    PSHx:   Past Surgical History[2]    Family history   History reviewed. No pertinent family history.    Medications:   Active Medications[3]     Medications Ordered Prior to Encounter[4]    Allergies:   Allergies[5]    Social Hx:   Social History     Socioeconomic History    Marital status: Single     Spouse name: Not on file    Number of children: Not on file    Years of education: Not on file    Highest education level: Not on file   Occupational History    Not on file   Tobacco Use    Smoking status: Never    Smokeless tobacco: Never   Vaping Use    Vaping status: Never Used   Substance and Sexual Activity    Alcohol use: Yes     Comment: occ    Drug use: Not Currently    Sexual activity: Not on file   Other Topics Concern    Not on file   Social History Narrative    Not on file     Social Drivers of Health     Financial Resource Strain: Not on file   Food Insecurity: Not on file   Transportation Needs: Not on file   Physical Activity: Not on file   Stress: Not on file   Social Connections: Not on file   Intimate Partner Violence: Not on file   Housing Stability: Not on file         EXAMINATION     Physical Exam:   Vitals: /84   Pulse 100   Temp 37.4 °C (99.3 °F) (Temporal)   Ht 1.702 m (5' 7\")   Wt 75 kg (165 lb 5.5 oz)   SpO2 100%       Constitutional:   Body Habitus: Body mass index is 25.9 kg/m².  Cooperation: Fully cooperates with exam  Appearance: Well-groomed, well-nourished  Respiratory:  Breathing comfortable on room air, no audible wheezing  Cardiovascular: Skin appears well-perfused  Psychiatric: Appropriate affect  Gait: normal gait, no use of ambulatory device, nonantalgic.     Neurologic:  Strength:    Bilateral LE 5/5 in hip flexors, knee extensors and flexors, ankle dorsiflexors and " plantarflexors  Sensation: decreased sensation left L5/S1 distribution compared to right      MEDICAL DECISION MAKING    DATA    Imaging:   No spine imaging      DIAGNOSIS   Visit Diagnoses     ICD-10-CM   1. Left leg numbness  R20.0   2. Chronic bilateral low back pain with bilateral sciatica  M54.42    M54.41    G89.29   3. Discogenic low back pain  M51.360   4. Lumbar radiculopathy  M54.16   5. Claustrophobia  F40.240         ASSESSMENT and PLAN:     Scarlet Rivera is a 24 y.o. female who returns to clinic for follow-up of low back pain and leg numbness.    Scarlet was seen today for follow-up.    Diagnoses and all orders for this visit:    Left leg numbness  -     MR-LUMBAR SPINE-W/O; Future  -     gabapentin (NEURONTIN) 100 MG Cap; Take 1 Capsule by mouth every evening for 4 days, THEN 2 Capsules every evening for 4 days, THEN 3 Capsules every evening for 22 days.    Chronic bilateral low back pain with bilateral sciatica  -     MR-LUMBAR SPINE-W/O; Future  -     gabapentin (NEURONTIN) 100 MG Cap; Take 1 Capsule by mouth every evening for 4 days, THEN 2 Capsules every evening for 4 days, THEN 3 Capsules every evening for 22 days.    Discogenic low back pain  -     MR-LUMBAR SPINE-W/O; Future  -     gabapentin (NEURONTIN) 100 MG Cap; Take 1 Capsule by mouth every evening for 4 days, THEN 2 Capsules every evening for 4 days, THEN 3 Capsules every evening for 22 days.    Lumbar radiculopathy  -     MR-LUMBAR SPINE-W/O; Future  -     gabapentin (NEURONTIN) 100 MG Cap; Take 1 Capsule by mouth every evening for 4 days, THEN 2 Capsules every evening for 4 days, THEN 3 Capsules every evening for 22 days.    Claustrophobia  -     ALPRAZolam (XANAX) 0.5 MG Tab; Take 1 Tablet by mouth as needed for Anxiety (take one tab 1 hour prior to MRI. OK to repeat x 1. do not drive on this med) for up to 1 day.      Assessment & Plan  Bilateral low back pain with radiation into the bilateral lower extremities  Left leg  numbness  Lumbar radiculopathy  Patient presents for follow-up of left greater than right low back pain with radiation into the left greater than right posterior thighs with numbness in the left L5/S1 distribution on exam today, unfortunately worsening despite trials of multiple prescription medications and initiation of home exercise program. The patient's symptoms suggest a potential injury or inflammation in the lower spinal discs, likely at the L5-S1 level, given the location of her pain and numbness. Given worsening of pain and new fixed numbness on exam today, an MRI of the lumbar spine will be ordered to further investigate for pathology. A prescription for gabapentin 100 mg will be provided, to be uptitrated to 300 mg nightly. She is also advised to take Flexeril at night to aid in relaxation and sleep and continue meloxicam. A prescription for Xanax will be provided for claustrophobia, with instructions to take one pill approximately 45 minutes prior to the MRI and ensure she has a . If she does not feel the effects of two drinks about 20 minutes before the MRI after taking the first pill, she may take a second pill.        Follow up: After lumbar MRI in 3 weeks    Thank you for allowing me to participate in the care of this patient. If you have any questions please not hesitate to contact me.         Please note that this dictation was created using voice recognition software. I have made every reasonable attempt to correct obvious errors but there may be errors of grammar and content that I may have overlooked prior to finalization of this note.    Freya Patel MD  Interventional Spine and Sports Physiatry  Physical Medicine and Rehabilitation  [facility]         [1] History reviewed. No pertinent past medical history.  [2] History reviewed. No pertinent surgical history.  [3]   Outpatient Medications Marked as Taking for the 5/30/25 encounter (Office Visit) with Freya Patel M.D.    Medication Sig Dispense Refill    ALPRAZolam (XANAX) 0.5 MG Tab Take 1 Tablet by mouth as needed for Anxiety (take one tab 1 hour prior to MRI. OK to repeat x 1. do not drive on this med) for up to 1 day. 2 Tablet 0    gabapentin (NEURONTIN) 100 MG Cap Take 1 Capsule by mouth every evening for 4 days, THEN 2 Capsules every evening for 4 days, THEN 3 Capsules every evening for 22 days. 78 Capsule 0    meloxicam (MOBIC) 15 MG tablet Take 1 Tablet by mouth every day. 30 Tablet 1    cyclobenzaprine (FLEXERIL) 5 MG tablet Take 1 Tablet by mouth 3 times a day as needed for Moderate Pain or Muscle Spasms. 90 Tablet 1   [4]   Current Outpatient Medications on File Prior to Visit   Medication Sig Dispense Refill    meloxicam (MOBIC) 15 MG tablet Take 1 Tablet by mouth every day. 30 Tablet 1    cyclobenzaprine (FLEXERIL) 5 MG tablet Take 1 Tablet by mouth 3 times a day as needed for Moderate Pain or Muscle Spasms. 90 Tablet 1     No current facility-administered medications on file prior to visit.   [5] No Known Allergies

## 2025-05-30 ENCOUNTER — OFFICE VISIT (OUTPATIENT)
Dept: PHYSICAL MEDICINE AND REHAB | Facility: MEDICAL CENTER | Age: 25
End: 2025-05-30
Payer: COMMERCIAL

## 2025-05-30 VITALS
SYSTOLIC BLOOD PRESSURE: 112 MMHG | OXYGEN SATURATION: 100 % | DIASTOLIC BLOOD PRESSURE: 84 MMHG | HEART RATE: 100 BPM | WEIGHT: 165.34 LBS | HEIGHT: 67 IN | TEMPERATURE: 99.3 F | BODY MASS INDEX: 25.95 KG/M2

## 2025-05-30 DIAGNOSIS — G89.29 CHRONIC BILATERAL LOW BACK PAIN WITH BILATERAL SCIATICA: ICD-10-CM

## 2025-05-30 DIAGNOSIS — M54.16 LUMBAR RADICULOPATHY: ICD-10-CM

## 2025-05-30 DIAGNOSIS — M54.41 CHRONIC BILATERAL LOW BACK PAIN WITH BILATERAL SCIATICA: ICD-10-CM

## 2025-05-30 DIAGNOSIS — M51.360 DISCOGENIC LOW BACK PAIN: ICD-10-CM

## 2025-05-30 DIAGNOSIS — R20.0 LEFT LEG NUMBNESS: Primary | ICD-10-CM

## 2025-05-30 DIAGNOSIS — F40.240 CLAUSTROPHOBIA: ICD-10-CM

## 2025-05-30 DIAGNOSIS — M54.42 CHRONIC BILATERAL LOW BACK PAIN WITH BILATERAL SCIATICA: ICD-10-CM

## 2025-05-30 RX ORDER — GABAPENTIN 100 MG/1
CAPSULE ORAL
Qty: 78 CAPSULE | Refills: 0 | Status: SHIPPED | OUTPATIENT
Start: 2025-05-30 | End: 2025-06-29

## 2025-05-30 RX ORDER — ALPRAZOLAM 0.5 MG
0.5 TABLET ORAL PRN
Qty: 2 TABLET | Refills: 0 | Status: SHIPPED | OUTPATIENT
Start: 2025-05-30 | End: 2025-05-31

## 2025-05-30 ASSESSMENT — PATIENT HEALTH QUESTIONNAIRE - PHQ9: CLINICAL INTERPRETATION OF PHQ2 SCORE: 0

## 2025-05-30 ASSESSMENT — PAIN SCALES - GENERAL: PAINLEVEL_OUTOF10: 7=MODERATE-SEVERE PAIN

## 2025-07-18 ENCOUNTER — APPOINTMENT (OUTPATIENT)
Dept: RADIOLOGY | Facility: MEDICAL CENTER | Age: 25
End: 2025-07-18
Attending: STUDENT IN AN ORGANIZED HEALTH CARE EDUCATION/TRAINING PROGRAM
Payer: COMMERCIAL

## 2025-07-25 ENCOUNTER — APPOINTMENT (OUTPATIENT)
Dept: PHYSICAL MEDICINE AND REHAB | Facility: MEDICAL CENTER | Age: 25
End: 2025-07-25
Payer: COMMERCIAL